# Patient Record
Sex: FEMALE | Race: WHITE | NOT HISPANIC OR LATINO
[De-identification: names, ages, dates, MRNs, and addresses within clinical notes are randomized per-mention and may not be internally consistent; named-entity substitution may affect disease eponyms.]

---

## 2020-07-21 DIAGNOSIS — Z01.818 ENCOUNTER FOR OTHER PREPROCEDURAL EXAMINATION: ICD-10-CM

## 2020-07-21 PROBLEM — Z00.00 ENCOUNTER FOR PREVENTIVE HEALTH EXAMINATION: Status: ACTIVE | Noted: 2020-07-21

## 2020-07-25 ENCOUNTER — APPOINTMENT (OUTPATIENT)
Dept: DISASTER EMERGENCY | Facility: CLINIC | Age: 50
End: 2020-07-25

## 2020-08-21 ENCOUNTER — OUTPATIENT (OUTPATIENT)
Dept: OUTPATIENT SERVICES | Facility: HOSPITAL | Age: 50
LOS: 1 days | End: 2020-08-21
Payer: COMMERCIAL

## 2020-08-21 VITALS
TEMPERATURE: 98 F | RESPIRATION RATE: 16 BRPM | HEART RATE: 68 BPM | OXYGEN SATURATION: 99 % | SYSTOLIC BLOOD PRESSURE: 107 MMHG | WEIGHT: 130.95 LBS | HEIGHT: 65 IN | DIASTOLIC BLOOD PRESSURE: 68 MMHG

## 2020-08-21 DIAGNOSIS — Z98.890 OTHER SPECIFIED POSTPROCEDURAL STATES: Chronic | ICD-10-CM

## 2020-08-21 DIAGNOSIS — M21.622 BUNIONETTE OF LEFT FOOT: ICD-10-CM

## 2020-08-21 DIAGNOSIS — M20.41 OTHER HAMMER TOE(S) (ACQUIRED), RIGHT FOOT: ICD-10-CM

## 2020-08-21 DIAGNOSIS — Z01.818 ENCOUNTER FOR OTHER PREPROCEDURAL EXAMINATION: ICD-10-CM

## 2020-08-21 DIAGNOSIS — Z87.2 PERSONAL HISTORY OF DISEASES OF THE SKIN AND SUBCUTANEOUS TISSUE: Chronic | ICD-10-CM

## 2020-08-21 LAB
ANION GAP SERPL CALC-SCNC: 1 MMOL/L — LOW (ref 5–17)
BUN SERPL-MCNC: 9 MG/DL — SIGNIFICANT CHANGE UP (ref 7–23)
CALCIUM SERPL-MCNC: 9.2 MG/DL — SIGNIFICANT CHANGE UP (ref 8.5–10.1)
CHLORIDE SERPL-SCNC: 109 MMOL/L — HIGH (ref 96–108)
CO2 SERPL-SCNC: 31 MMOL/L — SIGNIFICANT CHANGE UP (ref 22–31)
CREAT SERPL-MCNC: 0.76 MG/DL — SIGNIFICANT CHANGE UP (ref 0.5–1.3)
GLUCOSE SERPL-MCNC: 72 MG/DL — SIGNIFICANT CHANGE UP (ref 70–99)
HCG SERPL-ACNC: <1 MIU/ML — SIGNIFICANT CHANGE UP
HCT VFR BLD CALC: 44.7 % — SIGNIFICANT CHANGE UP (ref 34.5–45)
HGB BLD-MCNC: 15.4 G/DL — SIGNIFICANT CHANGE UP (ref 11.5–15.5)
MCHC RBC-ENTMCNC: 29.8 PG — SIGNIFICANT CHANGE UP (ref 27–34)
MCHC RBC-ENTMCNC: 34.5 GM/DL — SIGNIFICANT CHANGE UP (ref 32–36)
MCV RBC AUTO: 86.6 FL — SIGNIFICANT CHANGE UP (ref 80–100)
NRBC # BLD: 0 /100 WBCS — SIGNIFICANT CHANGE UP (ref 0–0)
PLATELET # BLD AUTO: 323 K/UL — SIGNIFICANT CHANGE UP (ref 150–400)
POTASSIUM SERPL-MCNC: 4.6 MMOL/L — SIGNIFICANT CHANGE UP (ref 3.5–5.3)
POTASSIUM SERPL-SCNC: 4.6 MMOL/L — SIGNIFICANT CHANGE UP (ref 3.5–5.3)
RBC # BLD: 5.16 M/UL — SIGNIFICANT CHANGE UP (ref 3.8–5.2)
RBC # FLD: 12 % — SIGNIFICANT CHANGE UP (ref 10.3–14.5)
SODIUM SERPL-SCNC: 141 MMOL/L — SIGNIFICANT CHANGE UP (ref 135–145)
WBC # BLD: 5.81 K/UL — SIGNIFICANT CHANGE UP (ref 3.8–10.5)
WBC # FLD AUTO: 5.81 K/UL — SIGNIFICANT CHANGE UP (ref 3.8–10.5)

## 2020-08-21 PROCEDURE — 36415 COLL VENOUS BLD VENIPUNCTURE: CPT

## 2020-08-21 PROCEDURE — 84702 CHORIONIC GONADOTROPIN TEST: CPT

## 2020-08-21 PROCEDURE — 85027 COMPLETE CBC AUTOMATED: CPT

## 2020-08-21 PROCEDURE — 80048 BASIC METABOLIC PNL TOTAL CA: CPT

## 2020-08-21 PROCEDURE — G0463: CPT

## 2020-08-21 NOTE — H&P PST ADULT - NSANTHOSAYNRD_GEN_A_CORE
No. JERROD screening performed.  STOP BANG Legend: 0-2 = LOW Risk; 3-4 = INTERMEDIATE Risk; 5-8 = HIGH Risk

## 2020-08-21 NOTE — H&P PST ADULT - ASSESSMENT
scheduled for a arthroplasty 2nd PIP joint right foot / tailors bunionectomy left 5th metatarsal phalangeal joint on 8/31 with Dr. Gallegos scheduled for a arthroplasty 2nd PIP joint right foot / tailors bunionectomy left 5th metatarsal phalangeal joint on 8/31 with Dr. Gallegos    8/31/20 1:41pm  HPI as above  no recent illness or travel outside [facility]  No Current Medications   Physical Exam normal   Updated H & P  MSmithNP

## 2020-08-21 NOTE — H&P PST ADULT - NSICDXPROBLEM_GEN_ALL_CORE_FT
PROBLEM DIAGNOSES  Problem: Bunionette of left foot  Assessment and Plan: scheduled for a arthroplasty 2nd PIP joint right foot / tailors bunionectomy left 5th metatarsal phalangeal joint on 8/31 with Dr. Gallegos      Problem: Other hammer toe(s) (acquired), right foot  Assessment and Plan: scheduled for a arthroplasty 2nd PIP joint right foot / tailors bunionectomy left 5th metatarsal phalangeal joint on 8/31 with Dr. Gallegos      Problem: Pre-op evaluation  Assessment and Plan: Labs - CBC, BMP and HCG. To be swabbed for COVID @ [facility] with PCP required.   Pre op and Hibiclens instructions reviewed and given. Instructed to avoid NSAIDs and OTC supplements. Verbalized understanding

## 2020-08-21 NOTE — H&P PST ADULT - HISTORY OF PRESENT ILLNESS
scheduled for a arthroplasty 2nd PIP joint right foot / tailors bunionectomy left 5th metatarsal phalangeal joint on 8/31 woth Dr. Gallegos 48 yo female with no significant pmh, presents to PST with bilateral foot pain secondary to a right foot 2nd digit hammer toe and left foot 5th digit bunionette, scheduled for a arthroplasty 2nd PIP joint right foot / tailors bunionectomy left 5th metatarsal phalangeal joint on 8/31 with Dr. Gallegos

## 2020-08-21 NOTE — H&P PST ADULT - NSICDXPASTSURGICALHX_GEN_ALL_CORE_FT
PAST SURGICAL HISTORY:  H/O arthroscopy of shoulder right 2017    H/O cyst of breast Removed form right breast > 8 years ago

## 2020-08-23 DIAGNOSIS — Z01.818 ENCOUNTER FOR OTHER PREPROCEDURAL EXAMINATION: ICD-10-CM

## 2020-08-23 DIAGNOSIS — M21.622 BUNIONETTE OF LEFT FOOT: ICD-10-CM

## 2020-08-23 DIAGNOSIS — M20.41 OTHER HAMMER TOE(S) (ACQUIRED), RIGHT FOOT: ICD-10-CM

## 2020-08-26 PROBLEM — M21.622 BUNIONETTE OF LEFT FOOT: Chronic | Status: ACTIVE | Noted: 2020-08-21

## 2020-08-26 PROBLEM — M20.41 OTHER HAMMER TOE(S) (ACQUIRED), RIGHT FOOT: Chronic | Status: ACTIVE | Noted: 2020-08-21

## 2020-08-28 ENCOUNTER — OUTPATIENT (OUTPATIENT)
Dept: OUTPATIENT SERVICES | Facility: HOSPITAL | Age: 50
LOS: 1 days | End: 2020-08-28
Payer: COMMERCIAL

## 2020-08-28 DIAGNOSIS — Z87.2 PERSONAL HISTORY OF DISEASES OF THE SKIN AND SUBCUTANEOUS TISSUE: Chronic | ICD-10-CM

## 2020-08-28 DIAGNOSIS — Z11.59 ENCOUNTER FOR SCREENING FOR OTHER VIRAL DISEASES: ICD-10-CM

## 2020-08-28 DIAGNOSIS — Z98.890 OTHER SPECIFIED POSTPROCEDURAL STATES: Chronic | ICD-10-CM

## 2020-08-28 LAB — SARS-COV-2 RNA SPEC QL NAA+PROBE: SIGNIFICANT CHANGE UP

## 2020-08-28 PROCEDURE — U0003: CPT

## 2020-08-28 RX ORDER — SODIUM CHLORIDE 9 MG/ML
1000 INJECTION, SOLUTION INTRAVENOUS
Refills: 0 | Status: DISCONTINUED | OUTPATIENT
Start: 2020-08-31 | End: 2020-08-31

## 2020-08-28 NOTE — ASU PATIENT PROFILE, ADULT - PSH
H/O arthroscopy of shoulder  right 2017  H/O cyst of breast  Removed form right breast > 8 years ago

## 2020-08-30 ENCOUNTER — TRANSCRIPTION ENCOUNTER (OUTPATIENT)
Age: 50
End: 2020-08-30

## 2020-08-31 ENCOUNTER — RESULT REVIEW (OUTPATIENT)
Age: 50
End: 2020-08-31

## 2020-08-31 ENCOUNTER — OUTPATIENT (OUTPATIENT)
Dept: OUTPATIENT SERVICES | Facility: HOSPITAL | Age: 50
LOS: 1 days | End: 2020-08-31
Payer: COMMERCIAL

## 2020-08-31 VITALS
DIASTOLIC BLOOD PRESSURE: 82 MMHG | TEMPERATURE: 98 F | RESPIRATION RATE: 17 BRPM | OXYGEN SATURATION: 98 % | HEART RATE: 95 BPM | SYSTOLIC BLOOD PRESSURE: 120 MMHG

## 2020-08-31 VITALS
SYSTOLIC BLOOD PRESSURE: 102 MMHG | OXYGEN SATURATION: 99 % | RESPIRATION RATE: 17 BRPM | HEART RATE: 76 BPM | DIASTOLIC BLOOD PRESSURE: 73 MMHG

## 2020-08-31 DIAGNOSIS — Z87.2 PERSONAL HISTORY OF DISEASES OF THE SKIN AND SUBCUTANEOUS TISSUE: Chronic | ICD-10-CM

## 2020-08-31 DIAGNOSIS — Z98.890 OTHER SPECIFIED POSTPROCEDURAL STATES: Chronic | ICD-10-CM

## 2020-08-31 DIAGNOSIS — M21.622 BUNIONETTE OF LEFT FOOT: ICD-10-CM

## 2020-08-31 DIAGNOSIS — M20.41 OTHER HAMMER TOE(S) (ACQUIRED), RIGHT FOOT: ICD-10-CM

## 2020-08-31 DIAGNOSIS — Z01.818 ENCOUNTER FOR OTHER PREPROCEDURAL EXAMINATION: ICD-10-CM

## 2020-08-31 LAB — HCG UR QL: NEGATIVE — SIGNIFICANT CHANGE UP

## 2020-08-31 PROCEDURE — 81025 URINE PREGNANCY TEST: CPT

## 2020-08-31 PROCEDURE — 88311 DECALCIFY TISSUE: CPT

## 2020-08-31 PROCEDURE — 88312 SPECIAL STAINS GROUP 1: CPT | Mod: 26

## 2020-08-31 PROCEDURE — 88305 TISSUE EXAM BY PATHOLOGIST: CPT | Mod: 26

## 2020-08-31 PROCEDURE — 73630 X-RAY EXAM OF FOOT: CPT | Mod: 26,50

## 2020-08-31 PROCEDURE — 28285 REPAIR OF HAMMERTOE: CPT | Mod: RT

## 2020-08-31 PROCEDURE — 88311 DECALCIFY TISSUE: CPT | Mod: 26

## 2020-08-31 PROCEDURE — 28110 PART REMOVAL OF METATARSAL: CPT | Mod: LT

## 2020-08-31 PROCEDURE — 88305 TISSUE EXAM BY PATHOLOGIST: CPT

## 2020-08-31 PROCEDURE — 88313 SPECIAL STAINS GROUP 2: CPT

## 2020-08-31 PROCEDURE — 73630 X-RAY EXAM OF FOOT: CPT

## 2020-08-31 PROCEDURE — 88312 SPECIAL STAINS GROUP 1: CPT

## 2020-08-31 PROCEDURE — 88304 TISSUE EXAM BY PATHOLOGIST: CPT | Mod: 26

## 2020-08-31 PROCEDURE — 88304 TISSUE EXAM BY PATHOLOGIST: CPT

## 2020-08-31 PROCEDURE — 88313 SPECIAL STAINS GROUP 2: CPT | Mod: 26

## 2020-08-31 RX ORDER — CEFAZOLIN SODIUM 1 G
2000 VIAL (EA) INJECTION ONCE
Refills: 0 | Status: COMPLETED | OUTPATIENT
Start: 2020-08-31 | End: 2020-08-31

## 2020-08-31 RX ORDER — ONDANSETRON 8 MG/1
4 TABLET, FILM COATED ORAL ONCE
Refills: 0 | Status: DISCONTINUED | OUTPATIENT
Start: 2020-08-31 | End: 2020-08-31

## 2020-08-31 RX ORDER — SODIUM CHLORIDE 9 MG/ML
1000 INJECTION, SOLUTION INTRAVENOUS
Refills: 0 | Status: DISCONTINUED | OUTPATIENT
Start: 2020-08-31 | End: 2020-08-31

## 2020-08-31 RX ORDER — ACETAMINOPHEN WITH CODEINE 300MG-30MG
1 TABLET ORAL
Qty: 0 | Refills: 0 | DISCHARGE

## 2020-08-31 RX ORDER — HYDROMORPHONE HYDROCHLORIDE 2 MG/ML
0.5 INJECTION INTRAMUSCULAR; INTRAVENOUS; SUBCUTANEOUS ONCE
Refills: 0 | Status: DISCONTINUED | OUTPATIENT
Start: 2020-08-31 | End: 2020-08-31

## 2020-08-31 RX ORDER — OXYCODONE HYDROCHLORIDE 5 MG/1
5 TABLET ORAL ONCE
Refills: 0 | Status: DISCONTINUED | OUTPATIENT
Start: 2020-08-31 | End: 2020-08-31

## 2020-08-31 RX ADMIN — SODIUM CHLORIDE 50 MILLILITER(S): 9 INJECTION, SOLUTION INTRAVENOUS at 13:06

## 2020-08-31 RX ADMIN — SODIUM CHLORIDE 75 MILLILITER(S): 9 INJECTION, SOLUTION INTRAVENOUS at 15:23

## 2020-08-31 NOTE — BRIEF OPERATIVE NOTE - SPECIMENS
1) soft tissue and skin right foot 2) bone right foot 3) soft tissue and skin left foot 4) bone left foot

## 2020-08-31 NOTE — ASU DISCHARGE PLAN (ADULT/PEDIATRIC) - CARE PROVIDER_API CALL
Enrique Gallegos  Podiatric Medicine and Surgery  [address] [place] [address], [address]  [place] [address]  Phone: [phone]  Fax: [phone]  Established Patient  Scheduled Appointment: 09/03/2020 01:40 PM

## 2020-08-31 NOTE — BRIEF OPERATIVE NOTE - NSICDXBRIEFPROCEDURE_GEN_ALL_CORE_FT
PROCEDURES:  Tailor's bunionectomy of left foot 31-Aug-2020 15:31:03  Roxanna Barnes  Arthroplasty of toe of right foot 31-Aug-2020 15:30:53  Roxanna Barnes

## 2020-08-31 NOTE — ASU DISCHARGE PLAN (ADULT/PEDIATRIC) - ASU DC SPECIAL INSTRUCTIONSFT
Patient to keep dressing clean, dry and intact to bilateral foot   Patient to stay partial weight bearing as tolerated to bilateral foot with crutches  Patient to take rx medication as prescribed by Dr. Rosy Huertas 3 with codeine and Duricef sent to patient pharmacy  Patient to follow up with Dr. Gallegos in private clinic on Thursday 9/3/2020 at 1:40pm.   Do not change dressing at home

## 2020-08-31 NOTE — BRIEF OPERATIVE NOTE - NSICDXBRIEFPOSTOP_GEN_ALL_CORE_FT
POST-OP DIAGNOSIS:  Tailor's bunion of left foot 31-Aug-2020 15:32:29  Roxanna Barnes  Hammertoe of second toe of right foot 31-Aug-2020 15:32:11  Roxanna Barnes

## 2020-08-31 NOTE — ASU DISCHARGE PLAN (ADULT/PEDIATRIC) - PROVIDER TOKENS
PROVIDER:[TOKEN:[[id number]],SCHEDULEDAPPT:[09/03/2020],SCHEDULEDAPPTTIME:[01:40 PM],ESTABLISHEDPATIENT:[T]]

## 2020-08-31 NOTE — ASU DISCHARGE PLAN (ADULT/PEDIATRIC) - CALL YOUR DOCTOR IF YOU HAVE ANY OF THE FOLLOWING:
Bleeding that does not stop/Fever greater than (need to indicate Fahrenheit or Celsius)/Nausea and vomiting that does not stop/Excessive diarrhea/Inability to tolerate liquids or foods/Unable to urinate

## 2020-08-31 NOTE — BRIEF OPERATIVE NOTE - NSICDXBRIEFPREOP_GEN_ALL_CORE_FT
PRE-OP DIAGNOSIS:  Tailor's bunion of left foot 31-Aug-2020 15:31:49  Roxanna Barnes  Hammertoe of second toe of right foot 31-Aug-2020 15:31:35  Roxanna Barnes

## 2020-08-31 NOTE — BRIEF OPERATIVE NOTE - COMMENTS
Patient tolerated anesthesia and procedure well without any complications and was transported to PACU with vitals signs stable and neurovascular status stable to bilateral foot.

## 2020-11-08 ENCOUNTER — EMERGENCY (EMERGENCY)
Facility: HOSPITAL | Age: 50
LOS: 1 days | Discharge: HOME | End: 2020-11-08
Attending: STUDENT IN AN ORGANIZED HEALTH CARE EDUCATION/TRAINING PROGRAM | Admitting: EMERGENCY MEDICINE
Payer: COMMERCIAL

## 2020-11-08 VITALS
HEIGHT: 65 IN | DIASTOLIC BLOOD PRESSURE: 76 MMHG | RESPIRATION RATE: 16 BRPM | SYSTOLIC BLOOD PRESSURE: 113 MMHG | OXYGEN SATURATION: 98 % | HEART RATE: 64 BPM | TEMPERATURE: 98 F

## 2020-11-08 DIAGNOSIS — Z87.2 PERSONAL HISTORY OF DISEASES OF THE SKIN AND SUBCUTANEOUS TISSUE: Chronic | ICD-10-CM

## 2020-11-08 DIAGNOSIS — Z98.890 OTHER SPECIFIED POSTPROCEDURAL STATES: Chronic | ICD-10-CM

## 2020-11-08 LAB
ALBUMIN SERPL ELPH-MCNC: 3.7 G/DL — SIGNIFICANT CHANGE UP (ref 3.3–5)
ALP SERPL-CCNC: 70 U/L — SIGNIFICANT CHANGE UP (ref 40–120)
ALT FLD-CCNC: 31 U/L — SIGNIFICANT CHANGE UP (ref 12–78)
ANION GAP SERPL CALC-SCNC: 4 MMOL/L — LOW (ref 5–17)
AST SERPL-CCNC: 19 U/L — SIGNIFICANT CHANGE UP (ref 15–37)
BASOPHILS # BLD AUTO: 0.07 K/UL — SIGNIFICANT CHANGE UP (ref 0–0.2)
BASOPHILS NFR BLD AUTO: 1.1 % — SIGNIFICANT CHANGE UP (ref 0–2)
BILIRUB SERPL-MCNC: 0.3 MG/DL — SIGNIFICANT CHANGE UP (ref 0.2–1.2)
BUN SERPL-MCNC: 10 MG/DL — SIGNIFICANT CHANGE UP (ref 7–23)
CALCIUM SERPL-MCNC: 9 MG/DL — SIGNIFICANT CHANGE UP (ref 8.5–10.1)
CHLORIDE SERPL-SCNC: 110 MMOL/L — HIGH (ref 96–108)
CO2 SERPL-SCNC: 26 MMOL/L — SIGNIFICANT CHANGE UP (ref 22–31)
CREAT SERPL-MCNC: 0.89 MG/DL — SIGNIFICANT CHANGE UP (ref 0.5–1.3)
EOSINOPHIL # BLD AUTO: 0.22 K/UL — SIGNIFICANT CHANGE UP (ref 0–0.5)
EOSINOPHIL NFR BLD AUTO: 3.3 % — SIGNIFICANT CHANGE UP (ref 0–6)
GLUCOSE SERPL-MCNC: 93 MG/DL — SIGNIFICANT CHANGE UP (ref 70–99)
HCG SERPL-ACNC: <1 MIU/ML — SIGNIFICANT CHANGE UP
HCT VFR BLD CALC: 42.5 % — SIGNIFICANT CHANGE UP (ref 34.5–45)
HGB BLD-MCNC: 15 G/DL — SIGNIFICANT CHANGE UP (ref 11.5–15.5)
IMM GRANULOCYTES NFR BLD AUTO: 0.2 % — SIGNIFICANT CHANGE UP (ref 0–1.5)
LYMPHOCYTES # BLD AUTO: 2.4 K/UL — SIGNIFICANT CHANGE UP (ref 1–3.3)
LYMPHOCYTES # BLD AUTO: 36.1 % — SIGNIFICANT CHANGE UP (ref 13–44)
MAGNESIUM SERPL-MCNC: 2.4 MG/DL — SIGNIFICANT CHANGE UP (ref 1.6–2.6)
MCHC RBC-ENTMCNC: 30.9 PG — SIGNIFICANT CHANGE UP (ref 27–34)
MCHC RBC-ENTMCNC: 35.3 GM/DL — SIGNIFICANT CHANGE UP (ref 32–36)
MCV RBC AUTO: 87.4 FL — SIGNIFICANT CHANGE UP (ref 80–100)
MONOCYTES # BLD AUTO: 0.62 K/UL — SIGNIFICANT CHANGE UP (ref 0–0.9)
MONOCYTES NFR BLD AUTO: 9.3 % — SIGNIFICANT CHANGE UP (ref 2–14)
NEUTROPHILS # BLD AUTO: 3.33 K/UL — SIGNIFICANT CHANGE UP (ref 1.8–7.4)
NEUTROPHILS NFR BLD AUTO: 50 % — SIGNIFICANT CHANGE UP (ref 43–77)
NRBC # BLD: 0 /100 WBCS — SIGNIFICANT CHANGE UP (ref 0–0)
PLATELET # BLD AUTO: 289 K/UL — SIGNIFICANT CHANGE UP (ref 150–400)
POTASSIUM SERPL-MCNC: 5.2 MMOL/L — SIGNIFICANT CHANGE UP (ref 3.5–5.3)
POTASSIUM SERPL-SCNC: 5.2 MMOL/L — SIGNIFICANT CHANGE UP (ref 3.5–5.3)
PROT SERPL-MCNC: 7.2 G/DL — SIGNIFICANT CHANGE UP (ref 6–8.3)
RBC # BLD: 4.86 M/UL — SIGNIFICANT CHANGE UP (ref 3.8–5.2)
RBC # FLD: 12 % — SIGNIFICANT CHANGE UP (ref 10.3–14.5)
SODIUM SERPL-SCNC: 140 MMOL/L — SIGNIFICANT CHANGE UP (ref 135–145)
WBC # BLD: 6.65 K/UL — SIGNIFICANT CHANGE UP (ref 3.8–10.5)
WBC # FLD AUTO: 6.65 K/UL — SIGNIFICANT CHANGE UP (ref 3.8–10.5)

## 2020-11-08 PROCEDURE — 99284 EMERGENCY DEPT VISIT MOD MDM: CPT | Mod: 25

## 2020-11-08 PROCEDURE — 84702 CHORIONIC GONADOTROPIN TEST: CPT

## 2020-11-08 PROCEDURE — 83735 ASSAY OF MAGNESIUM: CPT

## 2020-11-08 PROCEDURE — 70450 CT HEAD/BRAIN W/O DYE: CPT

## 2020-11-08 PROCEDURE — 85025 COMPLETE CBC W/AUTO DIFF WBC: CPT

## 2020-11-08 PROCEDURE — 36415 COLL VENOUS BLD VENIPUNCTURE: CPT

## 2020-11-08 PROCEDURE — 70450 CT HEAD/BRAIN W/O DYE: CPT | Mod: 26

## 2020-11-08 PROCEDURE — 80053 COMPREHEN METABOLIC PANEL: CPT

## 2020-11-08 PROCEDURE — 99284 EMERGENCY DEPT VISIT MOD MDM: CPT

## 2020-11-08 NOTE — ED PROVIDER NOTE - PHYSICAL EXAMINATION
Vital signs as available reviewed.  General:  Comfortable, no acute distress.  Head:  Normocephalic, atraumatic.  Eyes:  Conjunctiva pink, no icterus.  Cardiovascular:  Regular rate, no obvious murmur.  Respiratory:  Clear to auscultation, good air entry bilaterally.  Abdomen:  Soft, non-tender.  Musculoskeletal:  No deformity or calf tenderness.  Neurologic: Alert and oriented, follows commands, extra-occular movements intact, no visual fields defect, right nasolabial fold flat, patient unable to puff left cheek, no upper extremity drift, no lower extremity drift, rapid alternating movements performed without difficulty, no sensory deficit, no aphasia, no dysarthria, no inattention. Rhomberg negative. gait normal. bilateral lip sensation intact and equal.  Skin:  Warm and dry.

## 2020-11-08 NOTE — ED ADULT NURSE NOTE - CHPI ED NUR SYMPTOMS NEG
no loss of consciousness/no vomiting/no weakness/no change in level of consciousness/no confusion/no numbness/no blurred vision/no fever/no nausea/no dizziness

## 2020-11-08 NOTE — ED PROVIDER NOTE - OBJECTIVE STATEMENT
49 F denies PMH here complaining of lip numbness. She report this morning her upper lip was twitching and yesterday her eye lid was twitching, then at 1 pm when she was in [place] visiting a relative she put on lip gloss and her left lower lip felt numb. She went to urgent care who recommended she go to the ER for evaluation of tia / stroke. patient describes lip numbness as like she had dental anesthesia that is wearing off. Sister at bedside thinks her bottom left lip looks slightly twisted. No headache, difficulty speaking or swallowing, no dizziness or loss of balance. No numbness / weakness in her arms or legs, no gait difficulty. patient notes increased stress.

## 2020-11-08 NOTE — ED PROVIDER NOTE - CLINICAL SUMMARY MEDICAL DECISION MAKING FREE TEXT BOX
subjective left lower lip numbness- ? CN 5 V3 neuropathy v electrolyte abnormality. Doubt TIA / stroke syndrome given specific distribution of findings.

## 2020-11-08 NOTE — ED PROVIDER NOTE - NSFOLLOWUPINSTRUCTIONS_ED_ALL_ED_FT
1) Follow-up with your Primary Medical Doctor and Dr. Brannon. Call today / next business day for prompt follow-up.  2) Return to Emergency room for any worsening or persistent pain, weakness, fever, or any other concerning symptoms.  3) See attached instruction sheets for additional information, including information regarding signs and symptoms to look out for, reasons to seek immediate care and other important instructions.  4) Follow-up with any specialists as discussed / noted as well. 1) Follow-up with your Primary Medical Doctor and Dr. Brannon. Call today / next business day for prompt follow-up.  2) Return to Emergency room for any worsening or persistent pain, weakness, fever, or any other concerning symptoms.  3) See attached instruction sheets for additional information, including information regarding signs and symptoms to look out for, reasons to seek immediate care and other important instructions.  4) Follow-up with any specialists as discussed / noted as well.      Paresthesia    AMBULATORY CARE:    Paresthesia is numbness, tingling, or burning. It can happen in any part of your body, but usually occurs in your legs, feet, arms, or hands.     Common signs and symptoms:   •No feeling in the affected area      •A feeling of pins and needles      •An electric shock feeling      •Heaviness      •Trouble moving the affected area      •A feeling that something is crawling under your skin      •A feeling of burning or of cold in the affected area      Seek care immediately if:   •You have severe pain along with numbness and tingling.      •Your legs suddenly become cold. You have trouble moving your legs, and they ache.      •You have increased weakness in a part of your body.      •You have uncontrolled movements.      Contact your healthcare provider if:   •Your symptoms do not improve.      •You have symptoms in more than one part of your body.      •You have questions or concerns about your condition or care.      Treatment will depend on what is causing your paresthesia. You may need to increase the amount of vitamin B in your blood. Your healthcare provider may change or stop a medicine you are taking that is causing your symptoms. Permanent paresthesia may be helped with nerve medicine. If you have diabetes, your healthcare provider or diabetes specialist can help you control your blood sugar levels. Your provider may recommend a splint or surgery if you have paresthesia caused by carpal tunnel syndrome.    Manage paresthesia:   •Protect the area from injury. You may injure or burn yourself if you lose feeling in the area. Be careful when you touch anything that could be hot. Wear sturdy shoes to protect your feet. Ask about other ways to protect yourself.       •Go to physical or occupational therapy if directed. Your provider may recommend therapy if you have a condition such as carpal tunnel syndrome. A physical therapist can teach you exercises to help strengthen the area or increase your ability to move it. An occupational therapist can help you find new ways to do your daily activities.      •Manage health conditions that can cause paresthesia. Work with your diabetes specialist if you have uncontrolled diabetes. A dietitian or your healthcare provider can help you create a meal plan if you have low vitamin B levels. Your provider can help you manage your health if you have multiple sclerosis or you had a stroke. It is important to manage health conditions to stop paresthesia or prevent it from getting worse.      Follow up with your healthcare provider or neurologist as directed: Your healthcare provider may refer you to a specialist. Write down your questions so you remember to ask them during your visits.

## 2020-11-08 NOTE — ED PROVIDER NOTE - NS ED ROS FT
Constitutional: No fever.  Neurological: No headache.  Eyes: No vision changes.   Ears, Nose, Mouth, Throat: No congestion.  Cardiovascular: No chest pain.  Respiratory: No difficulty breathing.  Gastrointestinal: No nausea or vomiting.  Genitourinary: No dysuria.  Musculoskeletal: No joint pain.  Integumentary (skin and/or breast): No rash.

## 2020-11-08 NOTE — ED PROVIDER NOTE - PATIENT PORTAL LINK FT
You can access the FollowMyHealth Patient Portal offered by [facility] by registering at the following website: [email or web address]. By joining [facility]’s FollowMyHealth portal, you will also be able to view your health information using other applications (apps) compatible with our system.
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
## 2020-11-08 NOTE — ED ADULT NURSE NOTE - OBJECTIVE STATEMENT
Patient presents from Urgent Care with c/o L sided lip numbness since 1pm today.  Also states some lip tingling yesterday.  Notes increase in stress.  No familial or personal history of stroke.  Patient denies fevers, chills, HA, changes in vision, change in gait, or weakness.

## 2020-11-08 NOTE — ED PROVIDER NOTE - CARE PROVIDER_API CALL
Kris Brannon  NEUROLOGY  [address] [place] [address]  Phone: [phone]  Fax: [phone]  Follow Up Time:

## 2020-11-10 NOTE — H&P PST ADULT - BREASTS
Quality 110: Preventive Care And Screening: Influenza Immunization: Influenza Immunization not Administered because Patient Refused. Detail Level: Detailed not examined
